# Patient Record
Sex: MALE | URBAN - METROPOLITAN AREA
[De-identification: names, ages, dates, MRNs, and addresses within clinical notes are randomized per-mention and may not be internally consistent; named-entity substitution may affect disease eponyms.]

---

## 2024-05-03 ENCOUNTER — APPOINTMENT (RX ONLY)
Dept: URBAN - METROPOLITAN AREA CLINIC 70 | Facility: CLINIC | Age: 49
Setting detail: DERMATOLOGY
End: 2024-05-03

## 2024-05-03 DIAGNOSIS — L81.4 OTHER MELANIN HYPERPIGMENTATION: ICD-10-CM

## 2024-05-03 DIAGNOSIS — L91.8 OTHER HYPERTROPHIC DISORDERS OF THE SKIN: ICD-10-CM | Status: STABLE

## 2024-05-03 PROBLEM — D48.5 NEOPLASM OF UNCERTAIN BEHAVIOR OF SKIN: Status: ACTIVE | Noted: 2024-05-03

## 2024-05-03 PROCEDURE — ? COUNSELING

## 2024-05-03 PROCEDURE — ? SKIN TAG REMOVAL WITH PATHOLOGY

## 2024-05-03 PROCEDURE — ? FULL BODY SKIN EXAM - DECLINED

## 2024-05-03 PROCEDURE — 99202 OFFICE O/P NEW SF 15 MIN: CPT | Mod: 25

## 2024-05-03 PROCEDURE — 11200 RMVL SKIN TAGS UP TO&INC 15: CPT

## 2024-05-03 ASSESSMENT — LOCATION DETAILED DESCRIPTION DERM: LOCATION DETAILED: LEFT CLAVICULAR NECK

## 2024-05-03 ASSESSMENT — LOCATION SIMPLE DESCRIPTION DERM: LOCATION SIMPLE: LEFT ANTERIOR NECK

## 2024-05-03 ASSESSMENT — LOCATION ZONE DERM: LOCATION ZONE: NECK

## 2024-05-03 NOTE — PROCEDURE: SKIN TAG REMOVAL WITH PATHOLOGY
Medical Necessity Information: It is in your best interest to select a reason for this procedure from the list below. All of these items fulfill various CMS LCD requirements except the new and changing color options.
Include Z78.9 (Other Specified Conditions Influencing Health Status) As An Associated Diagnosis?: No
Medical Necessity Clause: This procedure was medically necessary because the lesions that were treated were:
Lab Facility: 0
Detail Level: Detailed
Total Number Of Lesions Treated: 1
Biopsy Type: H and E
Skin Tags Removed With: scissors
Anesthesia Type: 1% lidocaine with epinephrine
Anesthesia Volume In Cc: 0.5
Additional Anesthesia Volume In Cc: 6
Hemostasis: Digna's
Post Procedure Care: Following the procedure hemostasis was acheived with drysol and bandages.
Consent: Written consent was obtained and risks were reviewed including but not limited to scarring, infection, bleeding, scabbing, incomplete removal, nerve damage and allergy to anesthesia.
Post-Care Instructions: I reviewed with the patient in detail post-care instructions. Patient is to keep the site dry overnight, and then apply bacitracin twice daily until healed.
Notification Instructions: Patient will be notified of biopsy results. However, patient instructed to call the office if not contacted within 2 weeks.
Billing Type: Third-Party Bill

## 2024-10-30 ENCOUNTER — APPOINTMENT (RX ONLY)
Dept: URBAN - METROPOLITAN AREA CLINIC 70 | Facility: CLINIC | Age: 49
Setting detail: DERMATOLOGY
End: 2024-10-30

## 2024-10-30 DIAGNOSIS — L0390 CELLULITIS AND ABSCESS OF UNSPECIFIED SITES: ICD-10-CM

## 2024-10-30 DIAGNOSIS — L0391 CELLULITIS AND ABSCESS OF UNSPECIFIED SITES: ICD-10-CM

## 2024-10-30 PROBLEM — L02.213 CUTANEOUS ABSCESS OF CHEST WALL: Status: ACTIVE | Noted: 2024-10-30

## 2024-10-30 PROBLEM — L02.211 CUTANEOUS ABSCESS OF ABDOMINAL WALL: Status: ACTIVE | Noted: 2024-10-30

## 2024-10-30 PROCEDURE — 10060 I&D ABSCESS SIMPLE/SINGLE: CPT

## 2024-10-30 PROCEDURE — 11900 INJECT SKIN LESIONS </W 7: CPT

## 2024-10-30 PROCEDURE — ? PRESCRIPTION

## 2024-10-30 PROCEDURE — ? INTRALESIONAL KENALOG

## 2024-10-30 PROCEDURE — ? INCISION AND DRAINAGE

## 2024-10-30 PROCEDURE — ? COUNSELING

## 2024-10-30 RX ORDER — DOXYCYCLINE 100 MG/1
CAPSULE ORAL BID
Qty: 28 | Refills: 0 | Status: ERX | COMMUNITY
Start: 2024-10-30

## 2024-10-30 RX ADMIN — DOXYCYCLINE: 100 CAPSULE ORAL at 00:00

## 2024-10-30 ASSESSMENT — LOCATION DETAILED DESCRIPTION DERM
LOCATION DETAILED: LEFT LATERAL INFERIOR CHEST
LOCATION DETAILED: LEFT INFRAMAMMARY CREASE (OUTER QUADRANT)

## 2024-10-30 ASSESSMENT — LOCATION ZONE DERM: LOCATION ZONE: TRUNK

## 2024-10-30 ASSESSMENT — LOCATION SIMPLE DESCRIPTION DERM
LOCATION SIMPLE: CHEST
LOCATION SIMPLE: LEFT BREAST

## 2024-10-30 NOTE — PROCEDURE: INTRALESIONAL KENALOG
X Size Of Lesion In Cm (Optional): 0
Validate Note Data When Using Inventory: Yes
Concentration Of Kenalog Solution Injected (Mg/Ml): 10.0
Bill For Wasted Drug (Kenalog)?: no
Medical Necessity Clause: This procedure was medically necessary because the lesions that were treated were:
Kenalog Type Of Vial: Single Dose
Administered By (Optional): TB
Kenalog Preparation: Kenalog
Ndc# For Kenalog Only: 99980-261-11
Detail Level: Detailed
Consent: The risks of atrophy were reviewed with the patient.
Total Volume (Ccs): .5

## 2024-10-30 NOTE — PROCEDURE: INCISION AND DRAINAGE
Detail Level: Detailed
Lesion Type: Abscess
Method: scalpel
Curette: No
Anesthesia Volume In Cc: 0.5
Size Of Lesion In Cm (Optional But May Be Required For Some Insurances): 0
Wound Care: Petrolatum
Dressing: dry sterile dressing
Epidermal Sutures: 4-0 Ethilon
Epidermal Closure: simple interrupted
Suture Text: The incision was partially closed with
Preparation Text: The area was prepped in the usual clean fashion.
Curette Text (Optional): After the contents were expressed a curette was used to partially remove the cyst wall.
Consent was obtained and risks were reviewed including but not limited to delayed wound healing, infection, need for multiple I and D's, and pain.
Post-Care Instructions: I reviewed with the patient in detail post-care instructions. Patient should keep wound covered and call the office should any redness, pain, swelling or worsening occur.

## 2024-10-30 NOTE — HPI: SKIN LESION
What Type Of Note Output Would You Prefer (Optional)?: Standard Output
How Severe Is Your Skin Lesion?: moderate
Has Your Skin Lesion Been Treated?: not been treated
Is This A New Presentation, Or A Follow-Up?: Skin Lesion
Additional History: Cyst on side of the chest

## 2024-11-25 ENCOUNTER — APPOINTMENT (RX ONLY)
Dept: URBAN - METROPOLITAN AREA CLINIC 70 | Facility: CLINIC | Age: 49
Setting detail: DERMATOLOGY
End: 2024-11-25

## 2024-11-25 DIAGNOSIS — L91.0 HYPERTROPHIC SCAR: ICD-10-CM

## 2024-11-25 PROCEDURE — ? INTRALESIONAL KENALOG

## 2024-11-25 PROCEDURE — ? PRESCRIPTION

## 2024-11-25 PROCEDURE — 11900 INJECT SKIN LESIONS </W 7: CPT

## 2024-11-25 PROCEDURE — ? PRESCRIPTION MEDICATION MANAGEMENT

## 2024-11-25 RX ORDER — MUPIROCIN 20 MG/G
OINTMENT TOPICAL
Qty: 22 | Refills: 0 | Status: ERX | COMMUNITY
Start: 2024-11-25

## 2024-11-25 RX ADMIN — MUPIROCIN: 20 OINTMENT TOPICAL at 00:00

## 2024-11-25 ASSESSMENT — LOCATION SIMPLE DESCRIPTION DERM: LOCATION SIMPLE: LEFT BREAST

## 2024-11-25 ASSESSMENT — LOCATION ZONE DERM: LOCATION ZONE: TRUNK

## 2024-11-25 ASSESSMENT — LOCATION DETAILED DESCRIPTION DERM: LOCATION DETAILED: LEFT INFRAMAMMARY CREASE (OUTER QUADRANT)

## 2024-11-25 NOTE — PROCEDURE: INTRALESIONAL KENALOG
X Size Of Lesion In Cm (Optional): 0
Validate Note Data When Using Inventory: Yes
Concentration Of Kenalog Solution Injected (Mg/Ml): 10.0
Bill For Wasted Drug (Kenalog)?: no
Medical Necessity Clause: This procedure was medically necessary because the lesions that were treated were:
Kenalog Type Of Vial: Single Dose
Administered By (Optional): TB
Kenalog Preparation: Kenalog
Lot # For Kenalog (Optional): [id number]
Ndc# For Kenalog Only: 42940-319-50
Detail Level: Detailed
Consent: The risks of atrophy were reviewed with the patient.
Expiration Date For Kenalog (Optional): APR 2026
Total Volume (Ccs): .5

## 2024-11-25 NOTE — PROCEDURE: PRESCRIPTION MEDICATION MANAGEMENT
Complete
Detail Level: Zone
Render In Strict Bullet Format?: No
Initiate Treatment: mupirocin 2 % topical ointment \\nQuantity: 22.0 g  Days Supply: 30\\nSig: Apply to AA BID